# Patient Record
Sex: FEMALE | Race: BLACK OR AFRICAN AMERICAN | Employment: FULL TIME | URBAN - METROPOLITAN AREA
[De-identification: names, ages, dates, MRNs, and addresses within clinical notes are randomized per-mention and may not be internally consistent; named-entity substitution may affect disease eponyms.]

---

## 2018-03-22 ENCOUNTER — APPOINTMENT (OUTPATIENT)
Dept: GENERAL RADIOLOGY | Facility: HOSPITAL | Age: 23
End: 2018-03-22
Attending: PHYSICIAN ASSISTANT
Payer: COMMERCIAL

## 2018-03-22 PROCEDURE — 71045 X-RAY EXAM CHEST 1 VIEW: CPT | Performed by: PHYSICIAN ASSISTANT

## 2018-03-22 NOTE — ED PROVIDER NOTES
Patient Seen in: [facility] AND [facility] Emergency Department    History   Patient presents with: Anxiety/Panic attack (neurologic)    Stated Complaint:     HPI    77-year-old female presents with chief complaint of anxiety.   Patient reports intermittent hist 139/95   Pulse 97   Temp 98.4 °F (36.9 °C) (Oral)   Resp 19   Ht 149.9 cm (4' 11\")   Wt 59 kg   LMP 03/21/2018   SpO2 100%   BMI 26.26 kg/m²      PULSE OX within normal limits on room air as interpreted by this provider.     Constitutional: The patient is DIFFERENTIAL WITH PLATELET    Narrative: The following orders were created for panel order CBC WITH DIFFERENTIAL WITH PLATELET.   Procedure                               Abnormality         Status                     --------- Prescribed:  Current Discharge Medication List    START taking these medications    LORazepam 0.5 MG Oral Tab  Take 1 tablet (0.5 mg total) by mouth 3 (three) times daily as needed for Anxiety.   Qty: 15 tablet Refills: 0

## 2018-03-22 NOTE — BH LEVEL OF CARE ASSESSMENT
Level of Care Assessment Note    General Questions  Why are you here?: \"It all started on Sunday but on Monday for three hours just crying. Today, I had a panic attack and I just blacked out. Precipitating Events: I started having anxiety two years ago. Behavior Toward Property: No  Danger to Others/Property Collateral Provided By: n/a  Describe Danger to Others/Property Collateral: n/a    Access to Means  Access to Means: No  Access to Firearm/Weapon: No  Do you have a firearm owner ID card?: No  Access Withdrawal Symptoms: No    Compulsive Behaviors  Are you/others concerned about any of the following behaviors over the past 30 days?: Denies                                              Functional Impairment  Currently Attending School: Yes  School Name a Level: Oriented X4  Insight: Fair  Judgment: Good  Thought Patterns  Clarity/Relevance: Coherent  Flow: Organized  Content: Ordinary  Level of Consciousness: Alert  Level of Consciousness: Alert  Behavior  Exhibited behavior: Participated; Appropriate to si

## 2018-03-22 NOTE — ED NOTES
Care assumed form triage Ambulates to stretcher with steady gait Presents hyperventilating tearful difficult to obtain a hx from After directed deep breathing Identifies mx stressors including grad school pressure/conflicts with roommates and verbal teleph

## 2020-02-14 NOTE — ED INITIAL ASSESSMENT (HPI)
Pt reports having a panic attack \" while thinking too much\" pt does not give much information. Pt is tearful denies SI or HI.
Pt with PMH of chronic back pain complains of lower back pain for 2 weeks, not ambulatory

## 2022-12-14 ENCOUNTER — WALK IN (OUTPATIENT)
Dept: URGENT CARE | Age: 27
End: 2022-12-14

## 2022-12-14 VITALS
TEMPERATURE: 98.3 F | RESPIRATION RATE: 20 BRPM | HEART RATE: 88 BPM | DIASTOLIC BLOOD PRESSURE: 78 MMHG | SYSTOLIC BLOOD PRESSURE: 115 MMHG | OXYGEN SATURATION: 98 %

## 2022-12-14 DIAGNOSIS — R68.89 FLU-LIKE SYMPTOMS: ICD-10-CM

## 2022-12-14 DIAGNOSIS — J06.9 VIRAL URI: Primary | ICD-10-CM

## 2022-12-14 LAB
FLUAV AG UPPER RESP QL IA.RAPID: NEGATIVE
FLUBV AG UPPER RESP QL IA.RAPID: NEGATIVE
INTERNAL PROCEDURAL CONTROLS ACCEPTABLE: YES
INTERNAL PROCEDURAL CONTROLS ACCEPTABLE: YES
SARS-COV+SARS-COV-2 AG RESP QL IA.RAPID: NOT DETECTED

## 2022-12-14 PROCEDURE — 99203 OFFICE O/P NEW LOW 30 MIN: CPT | Performed by: NURSE PRACTITIONER

## 2022-12-14 PROCEDURE — 87804 INFLUENZA ASSAY W/OPTIC: CPT | Performed by: NURSE PRACTITIONER

## 2022-12-14 PROCEDURE — 87426 SARSCOV CORONAVIRUS AG IA: CPT | Performed by: NURSE PRACTITIONER

## 2022-12-14 ASSESSMENT — ENCOUNTER SYMPTOMS
CHILLS: 1
COUGH: 1
NAUSEA: 0
LIGHT-HEADEDNESS: 0
VOMITING: 0
HEADACHES: 0
DIZZINESS: 0
DIARRHEA: 0
FEVER: 0
SORE THROAT: 1
SHORTNESS OF BREATH: 0
WHEEZING: 0
RHINORRHEA: 1

## (undated) NOTE — ED AVS SNAPSHOT
Jag Bright   MRN: [id number]    Department:  [facility]   Date of Visit:  3/22/2018           Disclosure     Insurance plans vary and the physician(s) referred by the ER may not be covered by your plan.  Please contact y CARE PHYSICIAN AT ONCE OR RETURN IMMEDIATELY TO THE EMERGENCY DEPARTMENT. If you have been prescribed any medication(s), please fill your prescription right away and begin taking the medication(s) as directed.   If you believe that any of the medications